# Patient Record
Sex: FEMALE | Race: WHITE | NOT HISPANIC OR LATINO | Employment: FULL TIME | URBAN - METROPOLITAN AREA
[De-identification: names, ages, dates, MRNs, and addresses within clinical notes are randomized per-mention and may not be internally consistent; named-entity substitution may affect disease eponyms.]

---
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
## 2020-08-11 ENCOUNTER — OFFICE VISIT (OUTPATIENT)
Dept: OBSTETRICS AND GYNECOLOGY | Age: 20
End: 2020-08-11

## 2020-08-11 VITALS
SYSTOLIC BLOOD PRESSURE: 120 MMHG | BODY MASS INDEX: 39.68 KG/M2 | HEIGHT: 72 IN | WEIGHT: 293 LBS | DIASTOLIC BLOOD PRESSURE: 72 MMHG

## 2020-08-11 DIAGNOSIS — Z00.00 WELL WOMAN EXAM WITHOUT GYNECOLOGICAL EXAM: Primary | ICD-10-CM

## 2020-08-11 DIAGNOSIS — Z30.41 ENCOUNTER FOR SURVEILLANCE OF CONTRACEPTIVE PILLS: ICD-10-CM

## 2020-08-11 PROCEDURE — 81025 URINE PREGNANCY TEST: CPT | Performed by: NURSE PRACTITIONER

## 2020-08-11 PROCEDURE — 99395 PREV VISIT EST AGE 18-39: CPT | Performed by: NURSE PRACTITIONER

## 2020-08-11 RX ORDER — LEVONORGESTREL AND ETHINYL ESTRADIOL 0.1-0.02MG
1 KIT ORAL DAILY
Qty: 90 TABLET | Refills: 3 | Status: SHIPPED | OUTPATIENT
Start: 2020-08-11 | End: 2021-07-28

## 2020-08-11 NOTE — PROGRESS NOTES
[facility]  Routine Annual Visit    2020    Patient: Sharon Pablo          MR#:[id number]      History of Present Illness    19 y.o. female  who presents for annual exam and to restart oral contraceptives.    She reports her periods have been mostly monthly - every 4-6 weeks.   She is not sexually active but desires to restart OCPs for cycle control.  Did well on the pill in the past with good cycle control and no problems.  Denies new medical hx.     No complaints or concerns today.    Patient's last menstrual period was 2020 (exact date).  Obstetric History:  OB History        0    Para   0    Term   0       0    AB   0    Living   0       SAB   0    TAB   0    Ectopic   0    Molar        Multiple   0    Live Births              Obstetric Comments   Currently taking birth control            Menstrual History:     Patient's last menstrual period was 2020 (exact date).       Sexual History:       ________________________________________  Patient Active Problem List   Diagnosis   • Hyperhydrosis disorder   • Abnormal uterine bleeding (AUB)   • Obesity       Past Medical History:   Diagnosis Date   • Exercise-induced asthma     HAD ASTHMA AS A BABY   • Injury of shoulder, right     history surgery       Past Surgical History:   Procedure Laterality Date   • BREAST BIOPSY Left 2017    Procedure: BREAST BIOPSY;  Surgeon: Vega Bennett MD;  Location: [facility];  Service:    • SHOULDER ARTHROSCOPY W/ SUPERIOR LABRAL ANTERIOR POSTERIOR REPAIR Right 3/31/2016    Procedure: RIGHT SHOULDER ARTHROSCOPY WITH BANKART REPAIR;  Surgeon: Dean Cox MD;  Location: [facility];  Service:    • TONSILLECTOMY AND ADENOIDECTOMY         Social History     Tobacco Use   Smoking Status Never Smoker   Smokeless Tobacco Never Used       Family History   Problem Relation Age of Onset   • Hypertension Father    • Diabetes Father    • Heart disease Maternal  "Grandmother    • Breast cancer Maternal Aunt 62        great Aunt   • Heart failure Maternal Grandfather    • No Known Problems Mother    • Ovarian cancer Neg Hx    • Colon cancer Neg Hx    • Uterine cancer Neg Hx    • Deep vein thrombosis Neg Hx    • Pulmonary embolism Neg Hx    • Malig Hyperthermia Neg Hx        Prior to Admission medications    Medication Sig Start Date End Date Taking? Authorizing Provider   cetirizine (zyrTEC) 10 MG tablet Take 10 mg by mouth Daily.   Yes Provider, MD Pratibha   PREVIFEM 0.25-35 MG-MCG per tablet TAKE ONE tablet (by mouth) EACH DAY 3/29/18   Bita Cordova MD       The following portions of the patient's history were reviewed and updated as appropriate: allergies, current medications, past family history, past medical history, past social history, past surgical history and problem list.    Review of Systems   Constitutional: Negative.    HENT: Negative.    Eyes: Negative for visual disturbance.   Respiratory: Negative for cough, shortness of breath and wheezing.    Cardiovascular: Negative for chest pain, palpitations and leg swelling.   Gastrointestinal: Negative for abdominal distention, abdominal pain, blood in stool, constipation, diarrhea, nausea and vomiting.   Endocrine: Negative for cold intolerance and heat intolerance.   Genitourinary: Negative for difficulty urinating, dyspareunia, dysuria, frequency, genital sores, hematuria, menstrual problem, pelvic pain, urgency, vaginal bleeding, vaginal discharge and vaginal pain.   Musculoskeletal: Negative.    Skin: Negative.    Neurological: Negative for dizziness, weakness, light-headedness, numbness and headaches.   Hematological: Negative.    Psychiatric/Behavioral: Negative.    Breasts: negative for lumps skin changes, dimpling, swelling, nipple changes/discharge bilaterally       Objective   Physical Exam    /72   Ht 185.4 cm (73\")   Wt (!) 138 kg (304 lb 9.6 oz)   LMP 08/03/2020 (Exact Date)   BMI " "40.19 kg/m²    BP Readings from Last 3 Encounters:   08/11/20 120/72   04/26/18 110/78 (34 %, Z = -0.42 /  88 %, Z = 1.16)*   08/15/17 (!) 112/55 (51 %, Z = 0.02 /  6 %, Z = -1.56)*     *BP percentiles are based on the 2017 AAP Clinical Practice Guideline for girls      Wt Readings from Last 3 Encounters:   08/11/20 (!) 138 kg (304 lb 9.6 oz) (>99 %, Z= 2.89)*   04/26/18 125 kg (276 lb) (>99 %, Z= 2.60)*   08/02/17 126 kg (277 lb 9 oz) (>99 %, Z= 2.63)*     * Growth percentiles are based on [facility] (Girls, 2-20 Years) data.        BMI: Estimated body mass index is 40.19 kg/m² as calculated from the following:    Height as of this encounter: 185.4 cm (73\").    Weight as of this encounter: 138 kg (304 lb 9.6 oz).            General:   alert, appears stated age and cooperative   Heart: regular rate and rhythm, S1, S2 normal, no murmur, click, rub or gallop   Lungs: clear to auscultation bilaterally   Abdomen: soft, non-tender, without masses or organomegaly   Breast: inspection negative, no nipple discharge or bleeding, no masses or nodularity palpable   Vulva: deferred   Vagina: deferred    Cervix: deferred    Uterus: deferred   Adnexa: deferred     Assessment:    1. Normal annual exam   2. Contraception- doing well on OCPs and desires to continue. Risks, benefits, alternatives, and proper use reinforced.   3.  Counseled on healthy lifestyle modifications, safe sex, condom use, and self breast exams. We discussed her weight today and I highly encouraged reduction- lifestyle modifications discussed.      Plan:    []  Rx:   []  Mammogram request made  []  PAP done  []  Occult fecal blood test (Insure)  []  Labs:   []  GC/Chl/TV  []  DEXA scan   []  Referral for colonoscopy:           BK Roth  8/11/2020 11:36  "

## 2020-08-12 ENCOUNTER — TELEPHONE (OUTPATIENT)
Dept: OBSTETRICS AND GYNECOLOGY | Age: 20
End: 2020-08-12

## 2020-08-12 LAB
ALBUMIN SERPL-MCNC: 4.6 G/DL (ref 3.5–5.2)
ALBUMIN/GLOB SERPL: 2.4 G/DL
ALP SERPL-CCNC: 69 U/L (ref 39–117)
ALT SERPL-CCNC: 29 U/L (ref 1–33)
AST SERPL-CCNC: 21 U/L (ref 1–32)
BILIRUB SERPL-MCNC: 0.2 MG/DL (ref 0–1.2)
BUN SERPL-MCNC: 10 MG/DL (ref 6–20)
BUN/CREAT SERPL: 12 (ref 7–25)
CALCIUM SERPL-MCNC: 9.7 MG/DL (ref 8.6–10.5)
CHLORIDE SERPL-SCNC: 101 MMOL/L (ref 98–107)
CO2 SERPL-SCNC: 25 MMOL/L (ref 22–29)
CREAT SERPL-MCNC: 0.83 MG/DL (ref 0.57–1)
ERYTHROCYTE [DISTWIDTH] IN BLOOD BY AUTOMATED COUNT: 12.3 % (ref 12.3–15.4)
GLOBULIN SER CALC-MCNC: 1.9 GM/DL
GLUCOSE SERPL-MCNC: 95 MG/DL (ref 65–99)
HCT VFR BLD AUTO: 40.7 % (ref 34–46.6)
HGB BLD-MCNC: 13.2 G/DL (ref 12–15.9)
MCH RBC QN AUTO: 28.2 PG (ref 26.6–33)
MCHC RBC AUTO-ENTMCNC: 32.4 G/DL (ref 31.5–35.7)
MCV RBC AUTO: 87 FL (ref 79–97)
PLATELET # BLD AUTO: 336 10*3/MM3 (ref 140–450)
POTASSIUM SERPL-SCNC: 4.9 MMOL/L (ref 3.5–5.2)
PROT SERPL-MCNC: 6.5 G/DL (ref 6–8.5)
RBC # BLD AUTO: 4.68 10*6/MM3 (ref 3.77–5.28)
SODIUM SERPL-SCNC: 138 MMOL/L (ref 136–145)
TSH SERPL DL<=0.005 MIU/L-ACNC: 1.41 UIU/ML (ref 0.27–4.2)
WBC # BLD AUTO: 7.72 10*3/MM3 (ref 3.4–10.8)

## 2020-08-12 NOTE — TELEPHONE ENCOUNTER
----- Message from BK Epstein sent at 8/12/2020  8:37 AM EDT -----  Please notify patient her blood work from yesterday returned WNL.

## 2021-07-28 RX ORDER — LEVONORGESTREL AND ETHINYL ESTRADIOL 0.1-0.02MG
KIT ORAL
Qty: 84 TABLET | Refills: 0 | Status: SHIPPED | OUTPATIENT
Start: 2021-07-28 | End: 2021-11-04

## 2021-11-04 RX ORDER — LEVONORGESTREL AND ETHINYL ESTRADIOL 0.1-0.02MG
KIT ORAL
Qty: 84 TABLET | Refills: 0 | Status: SHIPPED | OUTPATIENT
Start: 2021-11-04 | End: 2021-11-15 | Stop reason: SDUPTHER

## 2021-11-15 ENCOUNTER — OFFICE VISIT (OUTPATIENT)
Dept: OBSTETRICS AND GYNECOLOGY | Age: 21
End: 2021-11-15

## 2021-11-15 VITALS
DIASTOLIC BLOOD PRESSURE: 78 MMHG | HEIGHT: 72 IN | BODY MASS INDEX: 39.68 KG/M2 | WEIGHT: 293 LBS | SYSTOLIC BLOOD PRESSURE: 116 MMHG

## 2021-11-15 DIAGNOSIS — Z01.419 WELL WOMAN EXAM WITH ROUTINE GYNECOLOGICAL EXAM: Primary | ICD-10-CM

## 2021-11-15 PROCEDURE — 99395 PREV VISIT EST AGE 18-39: CPT | Performed by: NURSE PRACTITIONER

## 2021-11-15 RX ORDER — LEVONORGESTREL AND ETHINYL ESTRADIOL 0.1-0.02MG
1 KIT ORAL DAILY
Qty: 84 TABLET | Refills: 3 | Status: SHIPPED | OUTPATIENT
Start: 2021-11-15 | End: 2022-11-17 | Stop reason: SDUPTHER

## 2021-11-15 RX ORDER — ALBUTEROL SULFATE 90 UG/1
2 AEROSOL, METERED RESPIRATORY (INHALATION) 4 TIMES DAILY PRN
COMMUNITY
Start: 2021-09-20

## 2021-11-15 RX ORDER — FLUTICASONE PROPIONATE 50 MCG
SPRAY, SUSPENSION (ML) NASAL
COMMUNITY
Start: 2021-09-27

## 2021-11-15 NOTE — PROGRESS NOTES
[facility]  Routine Annual Visit    11/15/2021    Patient: Sharon Pablo          MR#:[id number]      History of Present Illness    21 y.o. female  who presents for annual exam and refill on oral contraceptives    OCPs continue to work well with good cycle control and no problems    Sharon reports continued abstinence- has never been sexually active.    No complaints today    Patient's last menstrual period was 2021 (exact date).  Obstetric History:  OB History        0    Para   0    Term   0       0    AB   0    Living   0       SAB   0    IAB   0    Ectopic   0    Molar        Multiple   0    Live Births              Obstetric Comments   Currently taking birth control            Menstrual History:     Patient's last menstrual period was 2021 (exact date).       Sexual History:       ________________________________________  Patient Active Problem List   Diagnosis   • Hyperhydrosis disorder   • Abnormal uterine bleeding (AUB)   • Obesity       Past Medical History:   Diagnosis Date   • Exercise-induced asthma     HAD ASTHMA AS A BABY   • Fibroadenoma 2017    excision Dr. Bennett   • Injury of shoulder, right     history surgery       Past Surgical History:   Procedure Laterality Date   • BREAST BIOPSY Left 2017    Procedure: BREAST BIOPSY;  Surgeon: Vega Bennett MD;  Location: [facility];  Service:    • SHOULDER ARTHROSCOPY W/ SUPERIOR LABRAL ANTERIOR POSTERIOR REPAIR Right 3/31/2016    Procedure: RIGHT SHOULDER ARTHROSCOPY WITH BANKART REPAIR;  Surgeon: Dean Cox MD;  Location: [facility];  Service:    • TONSILLECTOMY AND ADENOIDECTOMY         Social History     Tobacco Use   Smoking Status Never Smoker   Smokeless Tobacco Never Used       Family History   Problem Relation Age of Onset   • Hypertension Father    • Diabetes Father    • Heart disease Maternal Grandmother    • Breast cancer Maternal Aunt 62        great Aunt   • Heart  failure Maternal Grandfather    • No Known Problems Mother    • Ovarian cancer Neg Hx    • Colon cancer Neg Hx    • Uterine cancer Neg Hx    • Deep vein thrombosis Neg Hx    • Pulmonary embolism Neg Hx    • Malig Hyperthermia Neg Hx        Prior to Admission medications    Medication Sig Start Date End Date Taking? Authorizing Provider   albuterol sulfate  (90 Base) MCG/ACT inhaler Inhale 2 puffs 4 (Four) Times a Day As Needed. 9/20/21  Yes Pratibha Gao MD   Aviane 0.1-20 MG-MCG per tablet TAKE 1 TABLET BY MOUTH DAILY. 11/4/21  Yes Herlinda Sanchez APRN   cetirizine (zyrTEC) 10 MG tablet Take 10 mg by mouth Daily.   Yes Pratibha Gao MD   fluticasone (FLONASE) 50 MCG/ACT nasal spray SHAKE LIQUID AND USE 1 SPRAY IN EACH NOSTRIL EVERY DAY 9/27/21  Yes Pratibha Gao MD     ________________________________________    The following portions of the patient's history were reviewed and updated as appropriate: allergies, current medications, past family history, past medical history, past social history, past surgical history and problem list.    Review of Systems   Constitutional: Negative.    HENT: Negative.    Eyes: Negative for visual disturbance.   Respiratory: Negative for cough, shortness of breath and wheezing.    Cardiovascular: Negative for chest pain, palpitations and leg swelling.   Gastrointestinal: Negative for abdominal distention, abdominal pain, blood in stool, constipation, diarrhea, nausea and vomiting.   Endocrine: Negative for cold intolerance and heat intolerance.   Genitourinary: Negative for difficulty urinating, dyspareunia, dysuria, frequency, genital sores, hematuria, menstrual problem, pelvic pain, urgency, vaginal bleeding, vaginal discharge and vaginal pain.   Musculoskeletal: Negative.    Skin: Negative.    Neurological: Negative for dizziness, weakness, light-headedness, numbness and headaches.   Hematological: Negative.    Psychiatric/Behavioral: Negative.   "  Breasts: negative for lumps skin changes, dimpling, swelling, nipple changes/discharge bilaterally     Objective   Physical Exam    /78   Ht 185.4 cm (73\")   Wt (!) 148 kg (325 lb 9.6 oz)   LMP 11/13/2021 (Exact Date)   Breastfeeding No   BMI 42.96 kg/m²    BP Readings from Last 3 Encounters:   11/15/21 116/78   08/11/20 120/72   04/26/18 110/78 (34 %, Z = -0.42 /  88 %, Z = 1.16)*     *BP percentiles are based on the 2017 AAP Clinical Practice Guideline for girls      Wt Readings from Last 3 Encounters:   11/15/21 (!) 148 kg (325 lb 9.6 oz)   08/11/20 (!) 138 kg (304 lb 9.6 oz) (>99 %, Z= 2.89)*   04/26/18 125 kg (276 lb) (>99 %, Z= 2.60)*     * Growth percentiles are based on [facility] (Girls, 2-20 Years) data.        BMI: Estimated body mass index is 42.96 kg/m² as calculated from the following:    Height as of this encounter: 185.4 cm (73\").    Weight as of this encounter: 148 kg (325 lb 9.6 oz).            General:   alert, appears stated age and cooperative   Heart: regular rate and rhythm, S1, S2 normal, no murmur, click, rub or gallop   Lungs: clear to auscultation bilaterally   Abdomen: soft, non-tender, without masses or organomegaly   Breast: inspection negative, no nipple discharge or bleeding, no masses or nodularity palpable   Vulva: External genitalia including bartholin's glands, Urethra, [place]'s gland and urethra meatus are normal, Perineum, rectum and anus appear normal  and Bladder appears normal without significant prolapse    Vagina: normal mucosa, normal discharge   Cervix: not visualized   Uterus: exam is limited habitus    Adnexa: exam limited by habitus     Assessment:    Diagnoses and all orders for this visit:    1. Well woman exam with routine gynecological exam (Primary)    Other orders  -     levonorgestrel-ethinyl estradiol (Aviane) 0.1-20 MG-MCG per tablet; Take 1 tablet by mouth Daily.  Dispense: 84 tablet; Refill: 3      Exam very limited due to patient discomfort. Pap not " collected. Patient denies any past sexual activity. Does not use tampons either.     She will return again in 6 months and we can attempt again. She is happy with this plan.      Herlinda Sanchez, APRN  11/15/2021 08:46 EST

## 2022-11-17 ENCOUNTER — TELEPHONE (OUTPATIENT)
Dept: OBSTETRICS AND GYNECOLOGY | Age: 22
End: 2022-11-17

## 2022-11-17 RX ORDER — LEVONORGESTREL AND ETHINYL ESTRADIOL 0.1-0.02MG
1 KIT ORAL DAILY
Qty: 84 TABLET | Refills: 0 | Status: SHIPPED | OUTPATIENT
Start: 2022-11-17 | End: 2023-02-09 | Stop reason: SDUPTHER

## 2022-11-17 NOTE — TELEPHONE ENCOUNTER
Caller: Sharon Pablo    Relationship: Self    Best call back number: [phone]    Requested Prescriptions: levonorgestrel-ethinyl estradiol (Aviane) 0.1-20 MG-MCG per tablet  Requested Prescriptions      No prescriptions requested or ordered in this encounter        Pharmacy where request should be sent:  [facility] [place]    Additional details provided by patient: PT IS ILL AND HAD TO CANCEL HER APPT FOR 11/17/22 @ 1:30 W/ BK ALCOCER - PT HAS RESCHEDULED HER ANNUAL VISIT - NEW APPT 02/09/23 W/ BK ALCOCER  - PT WILL RUN OUT OF HER B/C IN 2 WEEKS AND WOULD LIKE REFILLS TO BE SENT IN TO HER PHARMACY.    PLEASE CALL THE PT AND LET HER KNOW IF THIS REQUEST WILL BE MADE.    PT CAN BE REACHED AT THE NUMBER LISTED ABOVE AT ANYTIME - LVM IF NEEDED    THANK YOU!    Does the patient have less than a 3 day supply:  [] Yes  [x] No    Kyra Smith Rep   11/17/22 11:34 EST

## 2023-02-09 ENCOUNTER — OFFICE VISIT (OUTPATIENT)
Dept: OBSTETRICS AND GYNECOLOGY | Age: 23
End: 2023-02-09
Payer: COMMERCIAL

## 2023-02-09 VITALS
HEIGHT: 72 IN | SYSTOLIC BLOOD PRESSURE: 116 MMHG | BODY MASS INDEX: 39.68 KG/M2 | WEIGHT: 293 LBS | DIASTOLIC BLOOD PRESSURE: 82 MMHG

## 2023-02-09 DIAGNOSIS — Z30.41 ORAL CONTRACEPTIVE PILL SURVEILLANCE: Primary | ICD-10-CM

## 2023-02-09 PROCEDURE — 99213 OFFICE O/P EST LOW 20 MIN: CPT | Performed by: NURSE PRACTITIONER

## 2023-02-09 RX ORDER — LEVONORGESTREL AND ETHINYL ESTRADIOL 0.1-0.02MG
1 KIT ORAL DAILY
Qty: 84 TABLET | Refills: 1 | Status: SHIPPED | OUTPATIENT
Start: 2023-02-09

## 2023-02-09 NOTE — PROGRESS NOTES
"Subjective   Sharon Pablo is a 22 y.o. female.     History of Present Illness     Sharon presents for refill on oral contraceptive pills    She is doing well on OCPs and desires to continue.     Encouraged pap smear today but patient refuses. Still abstinent.     No complaints today    The following portions of the patient's history were reviewed and updated as appropriate: allergies, current medications, past family history, past medical history, past social history, past surgical history and problem list.    Review of Systems   Constitutional: Negative for chills, fatigue and fever.   Gastrointestinal: Negative for abdominal distention and abdominal pain.   Genitourinary: Negative for decreased urine volume, difficulty urinating, dyspareunia, dysuria, frequency, genital sores, hematuria, menstrual problem, pelvic pain, pelvic pressure, urgency, urinary incontinence, vaginal bleeding, vaginal discharge and vaginal pain.       Objective   Physical Exam  Constitutional:       Appearance: Normal appearance. She is not ill-appearing.   Skin:     General: Skin is warm and dry.   Neurological:      General: No focal deficit present.      Mental Status: She is alert and oriented to person, place, and time.   Psychiatric:         Mood and Affect: Mood normal.         Behavior: Behavior normal.         /82   Ht 185.4 cm (73\")   Wt (!) 158 kg (349 lb 6.4 oz)   LMP 01/19/2023 (Exact Date)   BMI 46.10 kg/m²       Assessment & Plan   Diagnoses and all orders for this visit:    1. Oral contraceptive pill surveillance (Primary)    Other orders  -     levonorgestrel-ethinyl estradiol (Aviane) 0.1-20 MG-MCG per tablet; Take 1 tablet by mouth Daily.  Dispense: 84 tablet; Refill: 1      Return in 3-6 months for annual exam    BK Roth           "

## 2023-05-22 NOTE — TELEPHONE ENCOUNTER
Caller: Sharon Pablo    Relationship: Self    Best call back number: [phone]    Requested Prescriptions:   Requested Prescriptions     Pending Prescriptions Disp Refills   • levonorgestrel-ethinyl estradiol (Aviane) 0.1-20 MG-MCG per tablet 84 tablet 1     Sig: Take 1 tablet by mouth Daily.        Pharmacy where request should be sent:  [facility] VERIF[facility]   Last office visit with prescribing clinician: Visit date not found   Last telemedicine visit with prescribing clinician: Visit date not found   Next office visit with prescribing clinician: Visit date not found     Additional details provided by patient: PT ONLY HAS ONE WEEK LEFT     Does the patient have less than a 3 day supply:  [] Yes  [x] No    Would you like a call back once the refill request has been completed: [] Yes [x] No    If the office needs to give you a call back, can they leave a voicemail: [] Yes [x] No    Kyra Black Rep   05/22/23 15:00 EDT

## 2023-05-23 RX ORDER — LEVONORGESTREL AND ETHINYL ESTRADIOL 0.1-0.02MG
1 KIT ORAL DAILY
Qty: 84 TABLET | Refills: 1 | Status: SHIPPED | OUTPATIENT
Start: 2023-05-23

## 2023-07-20 ENCOUNTER — TELEPHONE (OUTPATIENT)
Dept: OBSTETRICS AND GYNECOLOGY | Age: 23
End: 2023-07-20

## 2023-07-20 NOTE — TELEPHONE ENCOUNTER
DELETE AFTER REVIEWING: Send the encounter HIGH priority, If patient has less than a 3 day supply. If the patient will run out of medication over the weekend add that information to the additional details line. Send this encounter to the clinical pool.    Caller: Sharon Pablo    Relationship: Self    Best call back number: [phone]    Requested Prescriptions: AVIANE  Requested Prescriptions      No prescriptions requested or ordered in this encounter        [facility] [address] - [phone]  - [phone]     Last office visit with prescribing clinician: Visit date not found   Last telemedicine visit with prescribing clinician: Visit date not found   Next office visit with prescribing clinician: 2/9/2024     Additional details provided by patient: PATIENT WAS LAST SEEN 2/9/23 FOR BC BUT NEEDS AN ANNUAL//FORMER STEFANI KAM PATIENT//SCHEDULED FIRST AVAILABLE PROVIDER//PLEASE FOLLOW UP IF ANY ISSUES OR CONCERNS    Does the patient have less than a 3 day supply:  [] Yes  [x] No    Would you like a call back once the refill request has been completed: [x] Yes [] No    If the office needs to give you a call back, can they leave a voicemail: [x] Yes [] No    Kyra Vyas Rep   07/20/23 10:21 EDT         DELETE AFTER READING TO PATIENT: “Thank you for sharing this information with me. I will send a message to the clinical team. Please allow 48 hours for the -clinical staff to follow up on this request.”

## 2024-01-18 RX ORDER — LEVONORGESTREL AND ETHINYL ESTRADIOL 0.1-0.02MG
1 KIT ORAL DAILY
Qty: 84 TABLET | Refills: 3 | Status: SHIPPED | OUTPATIENT
Start: 2024-01-18

## 2024-02-09 ENCOUNTER — OFFICE VISIT (OUTPATIENT)
Dept: OBSTETRICS AND GYNECOLOGY | Age: 24
End: 2024-02-09
Payer: COMMERCIAL

## 2024-02-09 VITALS
BODY MASS INDEX: 26.79 KG/M2 | DIASTOLIC BLOOD PRESSURE: 72 MMHG | HEIGHT: 72 IN | SYSTOLIC BLOOD PRESSURE: 126 MMHG | WEIGHT: 197.8 LBS

## 2024-02-09 DIAGNOSIS — Z23 NEED FOR HPV VACCINATION: ICD-10-CM

## 2024-02-09 DIAGNOSIS — Z30.41 ORAL CONTRACEPTIVE PILL SURVEILLANCE: ICD-10-CM

## 2024-02-09 DIAGNOSIS — Z01.419 WELL WOMAN EXAM WITH ROUTINE GYNECOLOGICAL EXAM: Primary | ICD-10-CM

## 2024-02-09 DIAGNOSIS — Z12.4 SCREENING FOR CERVICAL CANCER: ICD-10-CM

## 2024-02-09 RX ORDER — LEVONORGESTREL AND ETHINYL ESTRADIOL 0.1-0.02MG
1 KIT ORAL DAILY
Qty: 84 TABLET | Refills: 3 | Status: SHIPPED | OUTPATIENT
Start: 2024-02-09

## 2024-02-09 NOTE — PROGRESS NOTES
[facility]   Obstetrics and Gynecology   Routine Annual Visit    2024    Patient: Sharon Pablo          MR#:[id number]    History of Present Illness    Chief Complaint   Patient presents with    Annual Exam     AE today, Last pap  nml, OCP's, No problems today       23 y.o. female  who presents for annual exam.  Reports regular monthly menses lasting 4-5 days without issue.  Takes OCPs.      Never had pap or gardasil but is open to both today    Obstetric History:  OB History          0    Para   0    Term   0       0    AB   0    Living   0         SAB   0    IAB   0    Ectopic   0    Molar        Multiple   0    Live Births              Obstetric Comments   Currently taking birth control              Menstrual History:     Patient's last menstrual period was 2024 (approximate).       Sexual History:   Sexually active with men, declines STD screening      Social History:   Teacher,  at [employer]    ________________________________________  Patient Active Problem List   Diagnosis    Hyperhydrosis disorder    Obesity     Past Medical History:   Diagnosis Date    Exercise-induced asthma     HAD ASTHMA AS A BABY    Fibroadenoma 2017    s/p left breast excision Dr. Bennett    Injury of shoulder, right     history surgery     Past Surgical History:   Procedure Laterality Date    BREAST BIOPSY Left 2017    fibroadenoma excision by Dr. Bennett    SHOULDER ARTHROSCOPY W/ SUPERIOR LABRAL ANTERIOR POSTERIOR REPAIR Right 2016    Procedure: RIGHT SHOULDER ARTHROSCOPY WITH BANKART REPAIR;  Surgeon: Dean Cox MD;  [place] [facility] [place] [facility] [place]  Service:     TONSILLECTOMY AND ADENOIDECTOMY       Social History     Tobacco Use   Smoking Status Never    Passive exposure: Never   Smokeless Tobacco Never     Family History   Problem Relation Age of Onset    Hypertension Father     Diabetes Father     No Known Problems Mother     Heart disease  "Maternal Grandmother     Heart failure Maternal Grandfather     Breast cancer Maternal Aunt 62        great Aunt    Ovarian cancer Neg Hx     Colon cancer Neg Hx     Uterine cancer Neg Hx     Deep vein thrombosis Neg Hx     Pulmonary embolism Neg Hx     Malig Hyperthermia Neg Hx      Prior to Admission medications    Medication Sig Start Date End Date Taking? Authorizing Provider   cyanocobalamin (VITAMIN B-12) 1000 MCG tablet Take 1 tablet by mouth Daily. 9/11/23 9/10/24 Yes Pratibha Gao MD   levonorgestrel-ethinyl estradiol (Aviane) 0.1-20 MG-MCG per tablet Take 1 tablet by mouth Daily. 1/18/24  Yes Rossana Ahumada MD   sertraline (ZOLOFT) 50 MG tablet Take 1 tablet by mouth Daily. 6/15/22 2/9/24 Yes Pratibha Gao MD   albuterol sulfate  (90 Base) MCG/ACT inhaler Inhale 2 puffs 4 (Four) Times a Day As Needed. 9/20/21 2/9/24  Pratibha Gao MD   cetirizine (zyrTEC) 10 MG tablet Take 10 mg by mouth Daily.  2/9/24  Pratibha Gao MD   fluticasone (FLONASE) 50 MCG/ACT nasal spray SHAKE LIQUID AND USE 1 SPRAY IN EACH NOSTRIL EVERY DAY 9/27/21 2/9/24  Pratibha Gao MD     ________________________________________    Current contraception: OCP (estrogen/progesterone)  History of abnormal Pap smear: no  Family history of uterine or ovarian cancer: no  Family History of colon cancer/colon polyps: no  History of abnormal mammogram: no    The following portions of the patient's history were reviewed and updated as appropriate: allergies, current medications, past family history, past medical history, past social history, past surgical history, and problem list.    Review of Systems   All other systems reviewed and are negative.           Objective     /72   Ht 185.4 cm (73\")   Wt 89.7 kg (197 lb 12.8 oz)   LMP 01/26/2024 (Approximate)   Breastfeeding No   BMI 26.10 kg/m²    BP Readings from Last 3 Encounters:   02/09/24 126/72   02/09/23 116/82   11/15/21 116/78 " "     Wt Readings from Last 3 Encounters:   02/09/24 89.7 kg (197 lb 12.8 oz)   02/09/23 (!) 158 kg (349 lb 6.4 oz)   11/15/21 (!) 148 kg (325 lb 9.6 oz)        BMI: Estimated body mass index is 26.1 kg/m² as calculated from the following:    Height as of this encounter: 185.4 cm (73\").    Weight as of this encounter: 89.7 kg (197 lb 12.8 oz).    Physical Exam  Vitals and nursing note reviewed.   Constitutional:       General: She is not in acute distress.     Appearance: Normal appearance.   HENT:      Head: Normocephalic and atraumatic.   Eyes:      Extraocular Movements: Extraocular movements intact.   Cardiovascular:      Rate and Rhythm: Normal rate and regular rhythm.      Pulses: Normal pulses.      Heart sounds: No murmur heard.  Pulmonary:      Effort: Pulmonary effort is normal. No respiratory distress.      Breath sounds: Normal breath sounds.   Chest:   Breasts:     Right: Normal. No mass, nipple discharge, skin change or tenderness.      Left: Normal. No mass, nipple discharge, skin change or tenderness.   Abdominal:      General: There is no distension.      Palpations: Abdomen is soft. There is no mass.      Tenderness: There is no abdominal tenderness.   Genitourinary:     General: Normal vulva.      Labia:         Right: No rash or lesion.         Left: No rash or lesion.       Urethra: No prolapse, urethral swelling or urethral lesion.      Vagina: Normal.      Cervix: Normal.      Uterus: Normal.       Adnexa: Right adnexa normal and left adnexa normal.      Comments: Bladder: no masses or tenderness  Perineum/Anus: no masses, lesions, or skin changes  Musculoskeletal:         General: No swelling. Normal range of motion.      Cervical back: Normal range of motion.   Lymphadenopathy:      Upper Body:      Right upper body: No axillary adenopathy.      Left upper body: No axillary adenopathy.   Skin:     General: Skin is warm and dry.   Neurological:      General: No focal deficit present.      " Mental Status: She is alert and oriented to person, place, and time.   Psychiatric:         Mood and Affect: Mood normal.         Behavior: Behavior normal.         As part of wellness and prevention, the following topics were discussed with the patient:  Encouraged self breast exam  Physical activity and regular exercised encouraged.   Injury prevention discussed.  Healthy weight discussed.  Nutrition discussed.  Substance abuse/misuse discussed.  Sexual behavior/safe practices discussed.   Sexual transmitted disease prevention   Contraception discussed.   Mental health discussed.   Vaccinations/immunizations addressed.             Assessment:  Diagnoses and all orders for this visit:    1. Well woman exam with routine gynecological exam (Primary)  -     IGP,rfx Aptima HPV All Pth  -     HPV 9-Valent Recomb Vaccine suspension 0.5 mL    2. Oral contraceptive pill surveillance  -     IGP,rfx Aptima HPV All Pth    3. Screening for cervical cancer    4. Need for HPV vaccination  -     HPV 9-Valent Recomb Vaccine suspension 0.5 mL    Other orders  -     levonorgestrel-ethinyl estradiol (Aviane) 0.1-20 MG-MCG per tablet; Take 1 tablet by mouth Daily.  Dispense: 84 tablet; Refill: 3      -Breast and pelvic exam normal  - Pap today  - Gardasil No. 1 today, schedule second dose in 2 months and third dose 4 months later  - Declined STD screen  - OCPs refilled    Plan:  Return for schedule RN visit in 2 mo for gardasil #2 and 4 mo later for gardasil #3, annual in 1 yr.      Rossana Ahumada MD  2/9/2024 14:34 EST

## 2024-02-14 LAB
CONV .: NORMAL
CYTOLOGIST CVX/VAG CYTO: NORMAL
CYTOLOGY CVX/VAG DOC CYTO: NORMAL
CYTOLOGY CVX/VAG DOC THIN PREP: NORMAL
DX ICD CODE: NORMAL
HIV 1 & 2 AB SER-IMP: NORMAL
Lab: NORMAL
OTHER STN SPEC: NORMAL
STAT OF ADQ CVX/VAG CYTO-IMP: NORMAL

## 2025-03-11 RX ORDER — TIMOLOL MALEATE 5 MG/ML
1 SOLUTION/ DROPS OPHTHALMIC DAILY
Qty: 84 TABLET | Refills: 0 | Status: SHIPPED | OUTPATIENT
Start: 2025-03-11

## 2025-03-19 ENCOUNTER — TELEPHONE (OUTPATIENT)
Dept: OBSTETRICS AND GYNECOLOGY | Age: 25
End: 2025-03-19
Payer: COMMERCIAL

## 2025-03-26 ENCOUNTER — OFFICE VISIT (OUTPATIENT)
Dept: OBSTETRICS AND GYNECOLOGY | Age: 25
End: 2025-03-26
Payer: COMMERCIAL

## 2025-03-26 VITALS
SYSTOLIC BLOOD PRESSURE: 108 MMHG | HEIGHT: 72 IN | BODY MASS INDEX: 26.01 KG/M2 | DIASTOLIC BLOOD PRESSURE: 64 MMHG | WEIGHT: 192 LBS

## 2025-03-26 DIAGNOSIS — Z30.41 ENCOUNTER FOR BIRTH CONTROL PILLS MAINTENANCE: ICD-10-CM

## 2025-03-26 DIAGNOSIS — Z23 NEED FOR HPV VACCINATION: ICD-10-CM

## 2025-03-26 DIAGNOSIS — Z01.419 WELL FEMALE EXAM WITH ROUTINE GYNECOLOGICAL EXAM: Primary | ICD-10-CM

## 2025-03-26 RX ORDER — LEVONORGESTREL/ETHIN.ESTRADIOL 0.1-0.02MG
1 TABLET ORAL DAILY
Qty: 84 TABLET | Refills: 3 | Status: SHIPPED | OUTPATIENT
Start: 2025-03-26

## 2025-03-26 NOTE — PROGRESS NOTES
Subjective     History of Present Illness    Chief Complaint   Patient presents with    Gynecologic Exam     Ae- last pap 2024 Neg        Sharon Pablo is a 24 y.o. female  who presents for annual exam.  Menses are regular every 28-30 days, lasting 4-7 days, dysmenorrhea none     Doing well, no complaints.  Contraception - On OCPs, has regular light menses with these.  Likes OCPs and request refills.  Gardasil HPV vaccine - received 1st dose last year.  Pt agreeable to receiving 2nd dose today and then returning in 4 months for 3rd/final dose.  Sexually active.  Declines STD testing.  Pap smear utd, normal 2024. Repeat in 3 yrs.   Social - works as , has spring break next week.     Relevant data reviewed:  IGP,rfx Aptima HPV All Pth (2024 14:20)     Obstetric History:  OB History          0    Para   0    Term   0       0    AB   0    Living   0         SAB   0    IAB   0    Ectopic   0    Molar        Multiple   0    Live Births              Obstetric Comments   Currently taking birth control              Menstrual History:     Patient's last menstrual period was 2025 (exact date).           Current contraception: OCP (estrogen/progesterone)  History of abnormal Pap smear: no  Received Gardasil immunization: yes - 1 dose, needs 2nd and 3rd dose  Perform regular self breast exam: yes -    Family history of uterine or ovarian cancer: no  Family History of colon cancer: no  Family history of breast cancer: yes - maternal great aunt dx 63 y/o    PAP: up to date - normal in   Mammogram: not indicated  Colonoscopy: not indicated  DEXA: not indicated    Exercise: moderately active  Calcium/Vitamin D: adequate intake    The following portions of the patient's history were reviewed and updated as appropriate: allergies, current medications, past family history, past medical history, past social history, past surgical history, and problem list.    Review of  "Systems  A comprehensive review of systems was negative.       Objective   Physical Exam    /64   Ht 185.4 cm (73\")   Wt 87.1 kg (192 lb)   LMP 03/18/2025 (Exact Date)   BMI 25.33 kg/m²      General: alert, appears stated age, cooperative, and no distress   Heart: regular rate and rhythm, S1, S2 normal, no murmur, click, rub or gallop   Lungs: clear to auscultation bilaterally   Abdomen: soft, non-tender, without masses or organomegaly   Breast: inspection negative, no nipple discharge or bleeding, no masses or nodularity palpable   External genitalia/Vulva: External genitalia including bartholin's glands, Urethra, [place]'s gland and urethra meatus are normal and Bladder appears normal without significant prolapse    Vagina: normal mucosa, normal discharge   Cervix: no lesions   Uterus: normal size and non-tender   Adnexa: normal adnexa and no mass, fullness, tenderness   Neurologic: Alert and Oriented x3   Psychiatric: Normal affect, judgement, and mood       Assessment & Plan   Diagnoses and all orders for this visit:    1. Well female exam with routine gynecological exam (Primary)    2. Encounter for birth control pills maintenance  -     levonorgestrel-ethinyl estradiol (Vienva) 0.1-20 MG-MCG per tablet; Take 1 tablet by mouth Daily.  Dispense: 84 tablet; Refill: 3    3. Need for HPV vaccination  -     HPV Vaccine (HPV9)          All questions answered.  Breast self exam technique reviewed and patient encouraged to perform self-exam monthly.  Physical activity and regular exercise encouraged.  Discussed healthy lifestyle modifications.  Sexual transmitted disease prevention discussed  Vaccinations/immunizations discussed  Contraception discussed    --OCP refills sent   --Pap smear up to date, normal in 2024. Repeat in 3 years.   --2nd Gardasil vaccine given today. Return in 4 months for nurse appt for 3rd/final Gardasil HPV vaccine dose.       Return for 4 months - nurse appt, gardasil #3. 1 year - " Annual Exam.       Charlotte Shaw PA-C  3/26/2025 11:08 EDT

## (undated) DEVICE — DRSNG SURESITE WNDW 4X4.5

## (undated) DEVICE — 3M™ STERI-STRIP™ REINFORCED ADHESIVE SKIN CLOSURES, R1547, 1/2 IN X 4 IN (12 MM X 100 MM), 6 STRIPS/ENVELOPE: Brand: 3M™ STERI-STRIP™

## (undated) DEVICE — 3M™ STERI-STRIP™ COMPOUND BENZOIN TINCTURE 40 BAGS/CARTON 4 CARTONS/CASE C1544: Brand: 3M™ STERI-STRIP™

## (undated) DEVICE — ANTIBACTERIAL UNDYED BRAIDED (POLYGLACTIN 910), SYNTHETIC ABSORBABLE SUTURE: Brand: COATED VICRYL

## (undated) DEVICE — NDL HYPO ECLPS SFTY 22G 1 1/2IN

## (undated) DEVICE — SPNG GZ WOVN 4X4IN 12PLY 10/BX STRL

## (undated) DEVICE — ELECTRD BLD EDGE/INSUL1P 2.4X5.1MM STRL

## (undated) DEVICE — LOU MINOR PROCEDURE: Brand: MEDLINE INDUSTRIES, INC.

## (undated) DEVICE — ENCORE® LATEX ORTHO SIZE 8, STERILE LATEX POWDER-FREE SURGICAL GLOVE: Brand: ENCORE

## (undated) DEVICE — SKIN PREP TRAY W/CHG: Brand: MEDLINE INDUSTRIES, INC.